# Patient Record
Sex: FEMALE | Race: WHITE
[De-identification: names, ages, dates, MRNs, and addresses within clinical notes are randomized per-mention and may not be internally consistent; named-entity substitution may affect disease eponyms.]

---

## 2018-06-24 ENCOUNTER — HOSPITAL ENCOUNTER (EMERGENCY)
Dept: HOSPITAL 25 - UCEAST | Age: 53
Discharge: HOME | End: 2018-06-24
Payer: COMMERCIAL

## 2018-06-24 VITALS — SYSTOLIC BLOOD PRESSURE: 129 MMHG | DIASTOLIC BLOOD PRESSURE: 85 MMHG

## 2018-06-24 DIAGNOSIS — L50.9: Primary | ICD-10-CM

## 2018-06-24 DIAGNOSIS — F17.210: ICD-10-CM

## 2018-06-24 DIAGNOSIS — I10: ICD-10-CM

## 2018-06-24 PROCEDURE — G0463 HOSPITAL OUTPT CLINIC VISIT: HCPCS

## 2018-06-24 PROCEDURE — 99212 OFFICE O/P EST SF 10 MIN: CPT

## 2018-06-24 NOTE — UC
Skin Complaint HPI





- HPI Summary


HPI Summary: 





51 yo female presents with "rash" over entire body that started last night. She 

tells me that she is currently on Augmentin for 10 days for a tooth infection - 

today is day 7. She has taken amoxicillin in the past with no troubles and has 

no known allergy to PCN. She has been outside more lately, but doesn't believe 

she came into contact with anything. Has no new soaps/detergents. Denies fever, 

chills, SOB, chest pain, or swelling.





- History of Current Complaint


Hx Obtained From: Patient


Hx Last Menstrual Period: 3-4 mos ago


Onset/Duration: Sudden Onset


Timing: Constant


Current Severity: None


Pain Intensity: 0





<Julius Macias - Last Filed: 06/24/18 11:35>





<Manuel Hightower - Last Filed: 06/24/18 13:54>





- History of Current Complaint


Chief Complaint: UCRash


Time Seen by Provider: 06/24/18 11:08


Stated Complaint: RASH





- Allergy/Home Medications


Allergies/Adverse Reactions: 


 Allergies











Allergy/AdvReac Type Severity Reaction Status Date / Time


 


No Known Allergies Allergy   Verified 06/24/18 10:52














Review of Systems


Constitutional: Negative


Skin: Rash


Respiratory: Negative


Cardiovascular: Negative


Neurovascular: Negative


Neurological: Negative


Psychological: Negative


All Other Systems Reviewed And Are Negative: Yes





<Julius Macias - Last Filed: 06/24/18 11:35>





PMH/Surg Hx/FS Hx/Imm Hx


Previously Healthy: Yes


Endocrine History: Dyslipidemia


Cardiovascular History: Hypertension





- Surgical History


Surgical History: Yes


Surgery Procedure, Year, and Place: CSP FUSION -LSP SURGERY, TRIPLE BYPASS ONE 

STENT





- Family History


Known Family History: Positive: Hypertension





- Social History


Lives: With Family


Alcohol Use: None


Substance Use Type: None


Smoking Status (MU): Former Smoker


Type: Cigarettes


Amount Used/How Often: less than 10


Length of Time of Smoking/Using Tobacco: 30 years


Have You Smoked in the Last Year: Yes


Household Exposure Type: Cigarettes





- Immunization History


Most Recent Influenza Vaccination: never


Most Recent Tetanus Shot: unsure


Most Recent Pneumonia Vaccination: never





<Julius Macias - Last Filed: 06/24/18 11:35>





Physical Exam





- Summary


Physical Exam Summary: 





GENERAL: NAD. WDWN. No pain distress.


SKIN: Scant scattered hives on the extremities <3mm in size. No streaking, 

bleeding, or drainage.


NECK: Supple. Nontender. No lymphadenopathy. 


CHEST:  No accessory muscle use. Breathing comfortably and in no distress.


CV:  RRR. Without m/r/g.


NEURO: Alert. CN II-XII grossly intact.


PSYCH: Age appropriate behavior.





Triage Information Reviewed: Yes


Vital Signs: 


 Initial Vital Signs











Temp  97.7 F   06/24/18 10:48


 


Pulse  100   06/24/18 10:48


 


Resp  20   06/24/18 10:48


 


BP  129/85   06/24/18 10:48


 


Pulse Ox  100   06/24/18 10:48














<Julius Macias - Last Filed: 06/24/18 11:35>


Vital Signs: 


 Initial Vital Signs











Temp  97.7 F   06/24/18 10:48


 


Pulse  100   06/24/18 10:48


 


Resp  20   06/24/18 10:48


 


BP  129/85   06/24/18 10:48


 


Pulse Ox  100   06/24/18 10:48














<Manuel Hightower - Last Filed: 06/24/18 13:54>





Course/Dx





- Course


Course Of Treatment: Urticaria. I don't suspect her rash is due to the 

Augmentin as she has no known PCN allergy, has taken amoxicillin the past, and 

has been on augmentin for 7 days without issue. Given that her hives are on her 

extremities - it is likely that she came into contact with an allergen.





- Diagnoses


Provider Diagnoses: urticaria





<Julius Macias Last Filed: 06/24/18 11:35>





Discharge





- Sign-Out/Discharge


Documenting (check all that apply): Discharge/Admit/Transfer





- Billing Disposition and Condition


Condition: STABLE


Disposition: Home





<Julius Macias Filed: 06/24/18 11:35>





- Billing Disposition and Condition


Condition: STABLE


Disposition: Home





<Manuel Hightower Last Filed: 06/24/18 13:54>





- Discharge Plan


Condition: Stable


Disposition: HOME


Prescriptions: 


predniSONE TAB* [Deltasone TAB*] 50 mg PO DAILY #5 tab


Patient Education Materials:  Acute Rash (ED)


Referrals: 


Scar Hassan MD [Primary Care Provider] - 


Additional Instructions: 


If you develop a fever, shortness of breath, chest pain, new or worsening 

symptoms - please call your PCP or go to the ED.


 















































Per institutional requirements, I have reviewed the chart, however, I was not 

consulted specifically or made aware of this patient by the above midlevel 

provider.  I did not personally evaluate, interact with , or disposition  this 

patient.

## 2018-08-22 ENCOUNTER — HOSPITAL ENCOUNTER (OUTPATIENT)
Dept: HOSPITAL 25 - OR | Age: 53
Discharge: HOME | End: 2018-08-22
Attending: ORTHOPAEDIC SURGERY
Payer: COMMERCIAL

## 2018-08-22 VITALS — DIASTOLIC BLOOD PRESSURE: 90 MMHG | SYSTOLIC BLOOD PRESSURE: 137 MMHG

## 2018-08-22 DIAGNOSIS — Z87.891: ICD-10-CM

## 2018-08-22 DIAGNOSIS — M75.42: ICD-10-CM

## 2018-08-22 DIAGNOSIS — G89.18: ICD-10-CM

## 2018-08-22 DIAGNOSIS — I25.10: ICD-10-CM

## 2018-08-22 DIAGNOSIS — M75.22: ICD-10-CM

## 2018-08-22 DIAGNOSIS — M19.012: Primary | ICD-10-CM

## 2018-08-22 DIAGNOSIS — Z95.1: ICD-10-CM

## 2018-08-22 DIAGNOSIS — Z95.5: ICD-10-CM

## 2018-08-22 DIAGNOSIS — E78.5: ICD-10-CM

## 2018-08-22 PROCEDURE — C1776 JOINT DEVICE (IMPLANTABLE): HCPCS

## 2018-08-23 NOTE — OP
CC:  PCP, Scar Hassan MD *

 

DATE OF OPERATION:  08/22/18 - hospitals

 

DATE OF BIRTH:  [birth year]

 

ATTENDING SURGEON:  Kam Gaviria MD

 

ASSISTANT:  NICHOLE Johnson.  An assistant was needed for the entirety of 
the case to help with positioning, retraction, and was utilized throughout all 
portions of the case.

 

ANESTHESIOLOGIST:  Dr. Case.

 

ANESTHESIA:  General, interscalene block.

 

PRE-OP DIAGNOSIS:  Left shoulder acromioclavicular joint arthritis with 
bicipital tendinitis, impingement.

 

POST-OP DIAGNOSIS:  Left shoulder acromioclavicular joint arthritis with 
bicipital tendinitis, impingement.

 

OPERATIVE PROCEDURE:

1.  Left shoulder arthroscopy with extensive glenohumeral debridement.

2.  Subacromial decompression with acromioplasty.

3.  Distal clavicle excision.

4.  Subpectoral biceps tenodesis.

5.  An 80 mg Depo-Medrol injection.

 

COMPLICATIONS:  None.

 

ESTIMATED BLOOD LOSS:  Minimal.

 

IMPLANTS USED:  One 2.8 mm Q-FIX anchor.

 

INDICATIONS:  Marisol Williamson is a 52-year-old female with persistent shoulder pain 
that is refractory to conservative management including physical therapy, 
antiinflammatories, and injections.  She is having persistent pain with her 
activities of daily living.  She is unable to sleep at night.  Risks and 
benefits of the surgery were discussed in length to include, but are not 
limited to, bleeding; infection; damage to nerves, vessels, surrounding 
structures; wound nonhealing; persistent pain; need for further surgery; 
scarring; stiffness; incomplete relief of symptoms; and the risk of anesthesia.

 

DESCRIPTION OF PROCEDURE:  The patient was greeted in the preoperative area by 
the attending surgeon.  Correct extremity was marked and the consent was 
confirmed. The patient underwent interscalene nerve block by the 
anesthesiologist, after which she was brought back to the operating suite.  She 
was placed supine position on operating table, then underwent general 
anesthesia and endotracheal intubation, after which she was placed in the right 
lateral decubitus position.  All bony prominences padded.  She was secured with 
a pegboard as well as an axillary roll was placed.  Left arm was draped 
unsterile with 10-pounds of traction.  The left shoulder was then prepped and 
draped in the usual sterile fashion beginning with chlorhexidine soap, scrub 
and alcohol wipe and a final prep with ChloraPrep.

 

After appropriate surgical pause indicating site, side, procedure and 
administration of antibiotics, the standard posterolateral portal was made 
sharply with 11-blade.  The scope was introduced into the joint.  The joint was 
examined. There were grade 0 to 1 changes in the glenohumeral joint.  The 
anterior, posterior, superior labrum had fraying.  The superior labrum was torn 
as well type

2.  Undersurface of the subscap had mild fraying about 5% or less.  There was 
damage to the biceps pulley and there was abundant synovitis.  The biceps was 
then tenotomized for lateral tenodesis.  The undersurface of the supraspinatus 
was intact.  The inferior recess was intact.  Once the intraarticular work was 
completed, attention was directed to subacromial space.

 

With the scope was positioned in subacromial space, the lateral portal was made 
in an outside fashion.  The abundant synovitis and bursitis was removed using 
the shaver and the electrocautery device was used to help maintain hemostasis.  
The undersurface of the acromion was then skeletonized using electrocautery 
device. Once this is done, a 4-0 oval bur was then used to an acromioplasty.  
All excess debris was removed from this portion of the case.  Attention was 
then directed to the distal clavicle.

 

The 4-0 oval bur was brought to the anterior portal.  The distal 8 mm of the 
clavicle were then debrided back using the bur.  Her AC joint meniscus was 
identified also and this was debrided back using shaver and biters.  All excess 
debris and fluid was removed.  Once all the debris was removed, an 18-gauge 
needle was placed in the subacromial space under arthroscopic visualization.  
The shoulder was then lavaged and attention was directed to biceps.

 

With the patient airplaned to the left side, a 15 blade was used to make an 
incision in line with the biceps tendon.  Soft tissues were carefully dissected 
to expose the pec tendon.  Soft tissues were carefully dissected and the 
remainder of dissection was done bluntly.  The biceps was then identified in 
the groove and biceps were brought to the wound.  The 

Q-FIX anchor was then used to drill unicortically and sutures were then passed 
in a Donovan-Khanh type configuration to the tendon and tied down.  Final images 
were obtained.  Sterile dressings were applied.  Cryo/Cuff and UltraSling were 
applied.  She was awoken from anesthesia, transferred to PACU in stable 
condition.

 

POSTOPERATIVE PLAN:  She will be nonweightbearing in a sling for approximately 
4 weeks.  She will be discharged on pain medication and antibiotics.  I will 
see the patient back in 10 to 14 days.  She will start physical therapy next 
week.

 

 [id number]/[facility] #: [id number]

[email or web address]

## 2019-06-07 ENCOUNTER — HOSPITAL ENCOUNTER (OUTPATIENT)
Dept: HOSPITAL 25 - CHICATH | Age: 54
Discharge: HOME | End: 2019-06-07
Attending: RADIOLOGY
Payer: COMMERCIAL

## 2019-06-07 VITALS — SYSTOLIC BLOOD PRESSURE: 109 MMHG | DIASTOLIC BLOOD PRESSURE: 75 MMHG

## 2019-06-07 DIAGNOSIS — E78.5: ICD-10-CM

## 2019-06-07 DIAGNOSIS — Z87.891: ICD-10-CM

## 2019-06-07 DIAGNOSIS — I70.213: ICD-10-CM

## 2019-06-07 DIAGNOSIS — I70.223: Primary | ICD-10-CM

## 2019-06-07 DIAGNOSIS — M75.112: ICD-10-CM

## 2019-06-07 DIAGNOSIS — I25.119: ICD-10-CM

## 2019-06-07 LAB
ANION GAP SERPL CALC-SCNC: 10 MMOL/L (ref 2–11)
BASOPHILS # BLD AUTO: 0.1 10^3/UL (ref 0–0.2)
BUN SERPL-MCNC: 11 MG/DL (ref 6–24)
BUN/CREAT SERPL: 17.7 (ref 8–20)
CALCIUM SERPL-MCNC: 9.7 MG/DL (ref 8.6–10.3)
CHLORIDE SERPL-SCNC: 108 MMOL/L (ref 101–111)
EOSINOPHIL # BLD AUTO: 0.3 10^3/UL (ref 0–0.6)
GLUCOSE SERPL-MCNC: 106 MG/DL (ref 70–100)
HCO3 SERPL-SCNC: 23 MMOL/L (ref 22–32)
HCT VFR BLD AUTO: 43 % (ref 35–47)
HGB BLD-MCNC: 14.7 G/DL (ref 12–16)
INR PPP/BLD: 0.99 (ref 0.82–1.09)
LYMPHOCYTES # BLD AUTO: 3.3 10^3/UL (ref 1–4.8)
MCH RBC QN AUTO: 33 PG (ref 27–31)
MCHC RBC AUTO-ENTMCNC: 34 G/DL (ref 31–36)
MCV RBC AUTO: 95 FL (ref 80–97)
MONOCYTES # BLD AUTO: 0.5 10^3/UL (ref 0–0.8)
NEUTROPHILS # BLD AUTO: 4 10^3/UL (ref 1.5–7.7)
NRBC # BLD AUTO: 0 10^3/UL
NRBC BLD QL AUTO: 0.1
PLATELET # BLD AUTO: 185 10^3/UL (ref 150–450)
POTASSIUM SERPL-SCNC: 3.7 MMOL/L (ref 3.5–5)
RBC # BLD AUTO: 4.51 10^6 /UL (ref 3.7–4.87)
SODIUM SERPL-SCNC: 141 MMOL/L (ref 135–145)
WBC # BLD AUTO: 8.2 10^3/UL (ref 3.5–10.8)

## 2019-06-07 PROCEDURE — 99157 MOD SED OTHER PHYS/QHP EA: CPT

## 2019-06-07 PROCEDURE — C1887 CATHETER, GUIDING: HCPCS

## 2019-06-07 PROCEDURE — 85025 COMPLETE CBC W/AUTO DIFF WBC: CPT

## 2019-06-07 PROCEDURE — 76937 US GUIDE VASCULAR ACCESS: CPT

## 2019-06-07 PROCEDURE — 80048 BASIC METABOLIC PNL TOTAL CA: CPT

## 2019-06-07 PROCEDURE — 99156 MOD SED OTH PHYS/QHP 5/>YRS: CPT

## 2019-06-07 PROCEDURE — C1894 INTRO/SHEATH, NON-LASER: HCPCS

## 2019-06-07 PROCEDURE — C1769 GUIDE WIRE: HCPCS

## 2019-06-07 PROCEDURE — 75716 ARTERY X-RAYS ARMS/LEGS: CPT

## 2019-06-07 PROCEDURE — 36415 COLL VENOUS BLD VENIPUNCTURE: CPT

## 2019-06-07 PROCEDURE — 85610 PROTHROMBIN TIME: CPT

## 2019-06-07 PROCEDURE — 75736 ARTERY X-RAYS PELVIS: CPT

## 2019-06-07 PROCEDURE — C1760 CLOSURE DEV, VASC: HCPCS

## 2022-02-16 NOTE — PN
Progress Note





- Progress Note


Date of Service: 06/07/19


SOAP: 


Subjective:


Denies pain at right groin or elsewhere. Wants to go home. 








Objective:





 Selected Entries











  06/07/19 06/07/19





  11:51 12:01


 


Heart Rate  67


 


Respiratory  17





Rate  


 


Blood Pressure  105/75





(mmHg)  


 


Blood Pressure  82





Mean  


 


O2 Sat by Pulse 98 





Oximetry  








NAD, AAO x 3


Right groin is soft, nontender


Dressing is clean, dry and intact


Palpable pulses in RLE at her baseline


RLE is grossly neuromuscular intact





Assessment:





53 YOF status post pelvic and BLE arteriography followed by AngioSeal 

percutaneous closure device to right common femoral arteriotomy. 





Plan:


1. Currently no focual occlusion or stenosis amenable to intervention (

angiographic images to be reviewed in more detail later). 


2. Pletal 50 mg PO BID


3. Patient given exercise therapy walking regimen sheet with instructions on 

how to use such a regimen for outdoor walking instead of a treadmill. 


4. IR clinic follow up in 4-6 weeks assess any improvement following 

pharmacotherapy and walking. 


5. Patient was carefully instructed to call IR clinic if she experiences any 

symptoms associated with Pletal (GI issues, headache, arrhythmias, etc)
Yes